# Patient Record
Sex: FEMALE | Race: WHITE | HISPANIC OR LATINO | URBAN - METROPOLITAN AREA
[De-identification: names, ages, dates, MRNs, and addresses within clinical notes are randomized per-mention and may not be internally consistent; named-entity substitution may affect disease eponyms.]

---

## 2022-07-09 ENCOUNTER — TELEPHONE ENCOUNTER (OUTPATIENT)
Dept: URBAN - METROPOLITAN AREA CLINIC 121 | Facility: CLINIC | Age: 47
End: 2022-07-09

## 2022-07-10 ENCOUNTER — TELEPHONE ENCOUNTER (OUTPATIENT)
Dept: URBAN - METROPOLITAN AREA CLINIC 121 | Facility: CLINIC | Age: 47
End: 2022-07-10

## 2022-07-30 ENCOUNTER — TELEPHONE ENCOUNTER (OUTPATIENT)
Age: 47
End: 2022-07-30

## 2022-07-31 ENCOUNTER — TELEPHONE ENCOUNTER (OUTPATIENT)
Age: 47
End: 2022-07-31

## 2022-08-01 ENCOUNTER — OFFICE VISIT (OUTPATIENT)
Dept: URBAN - METROPOLITAN AREA CLINIC 60 | Facility: CLINIC | Age: 47
End: 2022-08-01
Payer: COMMERCIAL

## 2022-08-01 ENCOUNTER — WEB ENCOUNTER (OUTPATIENT)
Dept: URBAN - METROPOLITAN AREA CLINIC 60 | Facility: CLINIC | Age: 47
End: 2022-08-01

## 2022-08-01 ENCOUNTER — LAB OUTSIDE AN ENCOUNTER (OUTPATIENT)
Dept: URBAN - METROPOLITAN AREA CLINIC 60 | Facility: CLINIC | Age: 47
End: 2022-08-01

## 2022-08-01 VITALS
SYSTOLIC BLOOD PRESSURE: 140 MMHG | WEIGHT: 225 LBS | OXYGEN SATURATION: 98 % | TEMPERATURE: 98.2 F | DIASTOLIC BLOOD PRESSURE: 80 MMHG | HEIGHT: 68 IN | HEART RATE: 88 BPM | RESPIRATION RATE: 20 BRPM | BODY MASS INDEX: 34.1 KG/M2

## 2022-08-01 DIAGNOSIS — K31.A12 INTESTINAL METAPLASIA OF BODY OF STOMACH WITHOUT DYSPLASIA: ICD-10-CM

## 2022-08-01 DIAGNOSIS — D50.8 OTHER IRON DEFICIENCY ANEMIA: ICD-10-CM

## 2022-08-01 PROCEDURE — 99203 OFFICE O/P NEW LOW 30 MIN: CPT | Performed by: INTERNAL MEDICINE

## 2022-08-01 RX ORDER — ONDANSETRON HYDROCHLORIDE 4 MG/1
1 TABLET TABLET, FILM COATED ORAL
Qty: 2 | Refills: 0 | OUTPATIENT
Start: 2022-08-01

## 2022-08-01 RX ORDER — FERROUS SULFATE 325(65) MG
1 TABLET TABLET ORAL ONCE A DAY
Status: ACTIVE | COMMUNITY

## 2022-08-01 NOTE — HPI-TODAY'S VISIT:
Martín is a pleasant 47-year-old female who presents today for evaluation of Benji, pernicious anemia and gastric intestinal metaplasia.  Seen in 2018 in consultation of sharp epigastric pain.  Status post laparoscopic sleeve gastrectomy on 7/6/2022 with Dr. Nelson.  Pathology demonstrated chronic gastritis and intestinal metaplasia.  H. pylori negative.  Upper GI series dated 7/7/2022 showed no postoperative leak or obstruction.  Contrasted sluggishly moving to the stomach from the esophagus which may be related to postoperative edema  Labs dated 7/7/2022 showed WBC 11.7, RBC 3.78, hemoglobin 8.4, hematocrit 29.1, MCV 77, platelets normal.  Sodium 134.  Normal renal function.  Previously with elevated parietal cell antibody and positive intrinsic factor antibody.  Patient currently denies any abd pain, rectal bleeding  diarrhea. States experiencing straining with BM which is chronic. Denies  rectal bleeding, family hx of CRC or polyps.  Does have menorraghea though improved over past 2 years( shorter duration).  States anemia is chronic . Denies melena. Lost 25lbs since surgery

## 2022-08-24 LAB
% SATURATION: 17
FERRITIN: 14
FOLATE (FOLIC ACID), SERUM: 13.2
INTRINSIC FACTOR BLOCKING: NEGATIVE
IRON BINDING CAPACITY: 282
IRON, TOTAL: 47
VITAMIN B12: 194

## 2022-09-06 ENCOUNTER — CLAIMS CREATED FROM THE CLAIM WINDOW (OUTPATIENT)
Dept: URBAN - METROPOLITAN AREA CLINIC 4 | Facility: CLINIC | Age: 47
End: 2022-09-06
Payer: COMMERCIAL

## 2022-09-06 ENCOUNTER — OFFICE VISIT (OUTPATIENT)
Dept: URBAN - METROPOLITAN AREA SURGERY CENTER 4 | Facility: SURGERY CENTER | Age: 47
End: 2022-09-06
Payer: COMMERCIAL

## 2022-09-06 DIAGNOSIS — K31.89 OTHER DISEASES OF STOMACH AND DUODENUM: ICD-10-CM

## 2022-09-06 DIAGNOSIS — K29.70 GASTRITIS, UNSPECIFIED, WITHOUT BLEEDING: ICD-10-CM

## 2022-09-06 DIAGNOSIS — K64.0 GRADE I HEMORRHOIDS: ICD-10-CM

## 2022-09-06 DIAGNOSIS — K31.A0 GASTRIC INTESTINAL METAPLASIA, UNSPECIFIED: ICD-10-CM

## 2022-09-06 DIAGNOSIS — K57.30 DIVERTCULOSIS OF LG INT W/O PERFORATION OR ABSCESS W/O BLEEDING: ICD-10-CM

## 2022-09-06 DIAGNOSIS — Z12.11 COLON CANCER SCREENING: ICD-10-CM

## 2022-09-06 DIAGNOSIS — K91.89 OTHER POSTPROCEDURAL COMPLICATIONS AND DISORDERS OF DIGESTIVE SYSTEM: ICD-10-CM

## 2022-09-06 DIAGNOSIS — K29.40 CHRONIC ATROPHIC GASTRITIS WITHOUT BLEEDING: ICD-10-CM

## 2022-09-06 PROCEDURE — G0121 COLON CA SCRN NOT HI RSK IND: HCPCS | Performed by: INTERNAL MEDICINE

## 2022-09-06 PROCEDURE — 88341 IMHCHEM/IMCYTCHM EA ADD ANTB: CPT | Performed by: PATHOLOGY

## 2022-09-06 PROCEDURE — 43239 EGD BIOPSY SINGLE/MULTIPLE: CPT | Performed by: INTERNAL MEDICINE

## 2022-09-06 PROCEDURE — G8918 PT W/O PREOP ORDER IV AB PRO: HCPCS | Performed by: INTERNAL MEDICINE

## 2022-09-06 PROCEDURE — 88305 TISSUE EXAM BY PATHOLOGIST: CPT | Performed by: PATHOLOGY

## 2022-09-06 PROCEDURE — G8907 PT DOC NO EVENTS ON DISCHARG: HCPCS | Performed by: INTERNAL MEDICINE

## 2022-09-06 PROCEDURE — 88342 IMHCHEM/IMCYTCHM 1ST ANTB: CPT | Performed by: PATHOLOGY

## 2022-09-06 RX ORDER — FERROUS SULFATE 325(65) MG
1 TABLET TABLET ORAL ONCE A DAY
Status: ACTIVE | COMMUNITY

## 2022-09-06 RX ORDER — ONDANSETRON HYDROCHLORIDE 4 MG/1
1 TABLET TABLET, FILM COATED ORAL
Qty: 2 | Refills: 0 | Status: ACTIVE | COMMUNITY
Start: 2022-08-01

## 2022-09-12 PROBLEM — 196731005 GASTRODUODENITIS: Status: ACTIVE | Noted: 2022-09-12

## 2022-09-12 PROBLEM — 398050005 DIVERTICULAR DISEASE OF COLON: Status: ACTIVE | Noted: 2022-09-12

## 2022-09-19 ENCOUNTER — OFFICE VISIT (OUTPATIENT)
Dept: URBAN - METROPOLITAN AREA CLINIC 60 | Facility: CLINIC | Age: 47
End: 2022-09-19

## 2022-10-26 ENCOUNTER — OFFICE VISIT (OUTPATIENT)
Dept: URBAN - METROPOLITAN AREA CLINIC 63 | Facility: CLINIC | Age: 47
End: 2022-10-26

## 2022-10-31 ENCOUNTER — OFFICE VISIT (OUTPATIENT)
Dept: URBAN - METROPOLITAN AREA CLINIC 60 | Facility: CLINIC | Age: 47
End: 2022-10-31
Payer: COMMERCIAL

## 2022-10-31 ENCOUNTER — DASHBOARD ENCOUNTERS (OUTPATIENT)
Age: 47
End: 2022-10-31

## 2022-10-31 VITALS
TEMPERATURE: 97.4 F | WEIGHT: 196 LBS | RESPIRATION RATE: 20 BRPM | HEIGHT: 68 IN | HEART RATE: 69 BPM | SYSTOLIC BLOOD PRESSURE: 128 MMHG | BODY MASS INDEX: 29.7 KG/M2 | DIASTOLIC BLOOD PRESSURE: 70 MMHG | OXYGEN SATURATION: 97 %

## 2022-10-31 DIAGNOSIS — D50.8 OTHER IRON DEFICIENCY ANEMIA: ICD-10-CM

## 2022-10-31 DIAGNOSIS — D51.0 VITAMIN B12 DEFICIENCY ANEMIA DUE TO INTRINSIC FACTOR DEFICIENCY: ICD-10-CM

## 2022-10-31 DIAGNOSIS — K31.A12 INTESTINAL METAPLASIA OF BODY OF STOMACH WITHOUT DYSPLASIA: ICD-10-CM

## 2022-10-31 PROBLEM — 87522002: Status: ACTIVE | Noted: 2022-08-01

## 2022-10-31 PROBLEM — 84027009: Status: ACTIVE | Noted: 2022-10-31

## 2022-10-31 PROCEDURE — 99213 OFFICE O/P EST LOW 20 MIN: CPT | Performed by: PHYSICIAN ASSISTANT

## 2022-10-31 RX ORDER — FERROUS SULFATE 325(65) MG
1 TABLET TABLET ORAL ONCE A DAY
Status: ACTIVE | COMMUNITY

## 2022-10-31 NOTE — HPI-TODAY'S VISIT:
Martín is a pleasant 47-year-old female who presents today for a procedure follow up. History of JANICE, pernicious anemia and gastric intestinal metaplasia.  Status post laparoscopic sleeve gastrectomy on 7/6/2022 with Dr. Nelson.  Pathology demonstrated chronic gastritis and intestinal metaplasia.  H. pylori negative.  Labs dated 8/1/2022 showed vitamin B12 194 (L).  Intrinsic factor blocking antibody negative.  Ferritin 14 but otherwise normal iron studies.  EGD dated 9/6/2022 demonstrated a regular Z-line.  Small hiatal hernia.  Gastritis.  Atrophic mucosa in the gastric antrum.  Postsurgical deformity in the entire stomach consistent with sleeve gastrectomy.  Duodenum normal. No significant duodenal abnormality.  Chemical/reactive gastropathy negative for H. pylori.  Body biopsies revealed chronic lymphoplasmacytic infiltrate, body type glandular atrophy, endocrine cell hyperplasia and focal intestinal metaplasia.  Negative for H. pylori.  Consistent with autoimmune gastritis but multifocal atrophic gastritis secondary to H. pylori infection cannot be completely ruled out.  Repeat EGD in 2 years  Colonoscopy dated 9/6/2022 showed diverticulosis in the sigmoid colon.  Nonbleeding internal hemorrhoids.  No specimens collected.  Repeat colonoscopy in 10 years per protocol.  Upper GI series dated 7/7/2022 showed no postoperative leak or obstruction.  Contrasted sluggishly moving to the stomach from the esophagus which may be related to postoperative edema  Labs dated 7/7/2022 showed WBC 11.7, RBC 3.78, hemoglobin 8.4, hematocrit 29.1, MCV 77, platelets normal.  Sodium 134.  Normal renal function.  Previously with elevated parietal cell antibody and positive intrinsic factor antibody.  No issues after procedure. History of menorrhagia. Last visit recommended Metamucil daily, prunes and a high-fiber diet.  Consideration of referral to hematology pending clinical course. Notes 1-2 daily bowel movements without constipation or diarrhea. Denies nausea, vomiting, heartburn, reflux, dysphagia, odynophagia, hematemesis, coffee ground emesis, abdominal pain, change in bowel habits, abnormal weight loss, BRBPR or melena. Denies family history of colon cancer or colon polyps. No other GI complaints at this time

## 2023-01-30 ENCOUNTER — OFFICE VISIT (OUTPATIENT)
Dept: URBAN - METROPOLITAN AREA CLINIC 60 | Facility: CLINIC | Age: 48
End: 2023-01-30

## 2024-11-18 PROBLEM — 84027009: Status: ACTIVE | Noted: 2024-11-18

## 2024-11-18 PROBLEM — 84568007: Status: ACTIVE | Noted: 2024-11-18

## 2024-11-20 ENCOUNTER — OFFICE VISIT (OUTPATIENT)
Dept: URBAN - METROPOLITAN AREA CLINIC 60 | Facility: CLINIC | Age: 49
End: 2024-11-20
Payer: COMMERCIAL

## 2024-11-20 ENCOUNTER — LAB OUTSIDE AN ENCOUNTER (OUTPATIENT)
Dept: URBAN - METROPOLITAN AREA CLINIC 60 | Facility: CLINIC | Age: 49
End: 2024-11-20

## 2024-11-20 VITALS
RESPIRATION RATE: 12 BRPM | OXYGEN SATURATION: 97 % | HEIGHT: 68 IN | SYSTOLIC BLOOD PRESSURE: 128 MMHG | WEIGHT: 187.8 LBS | TEMPERATURE: 98.7 F | DIASTOLIC BLOOD PRESSURE: 72 MMHG | HEART RATE: 76 BPM | BODY MASS INDEX: 28.46 KG/M2

## 2024-11-20 DIAGNOSIS — K31.A12 INTESTINAL METAPLASIA OF BODY OF STOMACH WITHOUT DYSPLASIA: ICD-10-CM

## 2024-11-20 DIAGNOSIS — D51.0 PERNICIOUS ANEMIA: ICD-10-CM

## 2024-11-20 DIAGNOSIS — K29.40 ATROPHIC GASTRITIS WITHOUT HEMORRHAGE: ICD-10-CM

## 2024-11-20 PROCEDURE — 99213 OFFICE O/P EST LOW 20 MIN: CPT

## 2024-11-20 RX ORDER — CYANOCOBALAMIN 1000 UG/ML
1 ML INJECTION INTRAMUSCULAR; SUBCUTANEOUS
Status: ACTIVE | COMMUNITY

## 2024-11-20 NOTE — HPI-TODAY'S VISIT:
Patient is a 49-year-old female with a past medical history of pernicious anemia and autoimmune intestinal metaplasia who presents today for EGD evaluation.  Patient is currently following with [facility] and is receiving iron infusions twice a year and vitamin B12 injections once a month.  They continue to monitor her blood work as well.  Reviewed last EGD and patient is due for repeat EGD for monitoring.  Patient denies fever, dysphagia, acid reflux, change in appetite, abdominal pain, nausea, vomiting, diarrhea, constipation, hematemesis, hematochezia, melena, change in stool frequency/caliber, unintentional weight loss/gain. She also denies issues with anesthesia, history of stroke, heart attack, pacemaker, defibrillator, stents, blood thinners, COPD, asthma, LEESA, chronic kidney disease and seizures. . Colonoscopy 9/6/2022 with Dr. Sandoval - Diverticulosis in the sigmoid colon - Nonbleeding internal hemorrhoids - No specimens collected - Repeat colonoscopy in 10 years . EGD 9/6/2022 with Dr. Sandoval - Z-line regular - Small hiatal hernia - Gastritis, biopsied - Gastric mucosal atrophy, biopsied - Full surgical deformity in the entire stomach - Normal first portion, second portion and third portion of duodenum, biopsied - Pathology: Second part duodenum biopsy-no significant abnormality; stomach antrum biopsy-chemical/reactive gastropathy, no evidence of H. pylori or intestinal metaplasia, negative for dysplasia or malignancy; stomach body biopsy-chronic lymphoplasmacytic infiltrate body type glandular atrophy, endocrine cell hyperplasia and focal intestinal metaplasia, negative for H. pylori, dysplasia or malignancy

## 2025-03-07 ENCOUNTER — OFFICE VISIT (OUTPATIENT)
Dept: URBAN - METROPOLITAN AREA SURGERY CENTER 4 | Facility: SURGERY CENTER | Age: 50
End: 2025-03-07

## 2025-03-21 ENCOUNTER — OFFICE VISIT (OUTPATIENT)
Dept: URBAN - METROPOLITAN AREA CLINIC 60 | Facility: CLINIC | Age: 50
End: 2025-03-21

## 2025-04-04 ENCOUNTER — CLAIMS CREATED FROM THE CLAIM WINDOW (OUTPATIENT)
Dept: URBAN - METROPOLITAN AREA SURGERY CENTER 4 | Facility: SURGERY CENTER | Age: 50
End: 2025-04-04
Payer: COMMERCIAL

## 2025-04-04 ENCOUNTER — CLAIMS CREATED FROM THE CLAIM WINDOW (OUTPATIENT)
Dept: URBAN - METROPOLITAN AREA CLINIC 4 | Facility: CLINIC | Age: 50
End: 2025-04-04
Payer: COMMERCIAL

## 2025-04-04 DIAGNOSIS — K29.40 CHRONIC ATROPHIC GASTRITIS WITHOUT BLEEDING: ICD-10-CM

## 2025-04-04 DIAGNOSIS — Z90.3 HISTORY OF SLEEVE GASTRECTOMY: ICD-10-CM

## 2025-04-04 DIAGNOSIS — K29.70 GASTRITIS, UNSPECIFIED, WITHOUT BLEEDING: ICD-10-CM

## 2025-04-04 DIAGNOSIS — R10.13 DYSPEPSIA: ICD-10-CM

## 2025-04-04 DIAGNOSIS — K31.A0 GASTRIC INTESTINAL METAPLASIA, UNSPECIFIED: ICD-10-CM

## 2025-04-04 PROCEDURE — 88342 IMHCHEM/IMCYTCHM 1ST ANTB: CPT | Performed by: PATHOLOGY

## 2025-04-04 PROCEDURE — 88305 TISSUE EXAM BY PATHOLOGIST: CPT | Performed by: PATHOLOGY

## 2025-04-04 PROCEDURE — 43239 EGD BIOPSY SINGLE/MULTIPLE: CPT | Performed by: INTERNAL MEDICINE

## 2025-04-04 PROCEDURE — 88312 SPECIAL STAINS GROUP 1: CPT | Performed by: PATHOLOGY

## 2025-04-04 PROCEDURE — 88341 IMHCHEM/IMCYTCHM EA ADD ANTB: CPT | Performed by: PATHOLOGY

## 2025-04-04 PROCEDURE — 00731 ANES UPR GI NDSC PX NOS: CPT | Performed by: NURSE ANESTHETIST, CERTIFIED REGISTERED

## 2025-04-04 RX ORDER — CYANOCOBALAMIN 1000 UG/ML
1 ML INJECTION INTRAMUSCULAR; SUBCUTANEOUS
Status: ACTIVE | COMMUNITY

## 2025-04-18 ENCOUNTER — OFFICE VISIT (OUTPATIENT)
Dept: URBAN - METROPOLITAN AREA CLINIC 60 | Facility: CLINIC | Age: 50
End: 2025-04-18
Payer: COMMERCIAL

## 2025-04-18 DIAGNOSIS — D51.0 PERNICIOUS ANEMIA: ICD-10-CM

## 2025-04-18 DIAGNOSIS — K59.09 CHRONIC CONSTIPATION: ICD-10-CM

## 2025-04-18 DIAGNOSIS — K31.A12 INTESTINAL METAPLASIA OF BODY OF STOMACH WITHOUT DYSPLASIA: ICD-10-CM

## 2025-04-18 DIAGNOSIS — K29.40 ATROPHIC GASTRITIS WITHOUT HEMORRHAGE: ICD-10-CM

## 2025-04-18 PROCEDURE — 99213 OFFICE O/P EST LOW 20 MIN: CPT

## 2025-04-18 RX ORDER — CYANOCOBALAMIN 1000 UG/ML
1 ML INJECTION INTRAMUSCULAR; SUBCUTANEOUS
Status: ACTIVE | COMMUNITY

## 2025-04-18 NOTE — HPI-TODAY'S VISIT:
Patient is a 49-year-old female with a past medical history of pernicious anemia and autoimmune intestinal metaplasia who presents today for review of EGD results.  Patient is currently following with [facility] and is receiving iron infusions twice a year and vitamin B-12 injections once a month.  They also continue to monitor her blood work and per patient everything has been okay.  Reviewed EGD results with patient.  At today's visit she is complaining of constipation issues that have been ongoing for most of her life.  She states that she will take Dulcolax as needed which does help her symptoms however she is not on any daily regimen. . EGD 4/4/2025 - Normal esophagus - Sleeve gastrectomy found characterized by healthy-appearing mucosa, biopsied - Normal examined duodenum - Pathology: Stomach antrum biopsy-chemical/reactive gastropathy, no evidence of H. pylori or intestinal metaplasia, negative for dysplasia or malignancy; stomach body and fundus biopsies-chronic lymphoplasmacytic infiltrate, body type glandular atrophy, endocrine cell hyperplasia and focal intestinal metaplasia, negative for H. pylori, dysplasia or malignancy - Repeat EGD in 2 years . Colonoscopy 9/6/2022 with Dr. Sandoval - Diverticulosis in the sigmoid colon - Nonbleeding internal hemorrhoids - No specimens collected - Repeat colonoscopy in 10 years . EGD 9/6/2022 with Dr. Sandoval - Z-line regular - Small hiatal hernia - Gastritis, biopsied - Gastric mucosal atrophy, biopsied - Full surgical deformity in the entire stomach - Normal first portion, second portion and third portion of duodenum, biopsied - Pathology: Second part duodenum biopsy-no significant abnormality; stomach antrum biopsy-chemical/reactive gastropathy, no evidence of H. pylori or intestinal metaplasia, negative for dysplasia or malignancy; stomach body biopsy-chronic lymphoplasmacytic infiltrate body type glandular atrophy, endocrine cell hyperplasia and focal intestinal metaplasia, negative for H. pylori, dysplasia or malignancy

## 2025-07-18 ENCOUNTER — OFFICE VISIT (OUTPATIENT)
Dept: URBAN - METROPOLITAN AREA CLINIC 60 | Facility: CLINIC | Age: 50
End: 2025-07-18

## 2025-07-18 NOTE — HPI-TODAY'S VISIT:
LOV 4/18/2025 History of atrophic gastritis, gastric intestinal metaplasia and pernicious anemia-currently following with [facility] for monitoring and treatment, repeat EGD due in 2027 Fiber supplement? MiraLAX as needed?  Magnesium citrate as needed?    LOV Patient is a 49-year-old female with a past medical history of pernicious anemia and autoimmune intestinal metaplasia who presents today for review of EGD results.  Patient is currently following with [facility] and is receiving iron infusions twice a year and vitamin B-12 injections once a month.  They also continue to monitor her blood work and per patient everything has been okay.  Reviewed EGD results with patient.  At today's visit she is complaining of constipation issues that have been ongoing for most of her life.  She states that she will take Dulcolax as needed which does help her symptoms however she is not on any daily regimen. . EGD 4/4/2025 - Normal esophagus - Sleeve gastrectomy found characterized by healthy-appearing mucosa, biopsied - Normal examined duodenum - Pathology: Stomach antrum biopsy-chemical/reactive gastropathy, no evidence of H. pylori or intestinal metaplasia, negative for dysplasia or malignancy; stomach body and fundus biopsies-chronic lymphoplasmacytic infiltrate, body type glandular atrophy, endocrine cell hyperplasia and focal intestinal metaplasia, negative for H. pylori, dysplasia or malignancy - Repeat EGD in 2 years . Colonoscopy 9/6/2022 with Dr. Sandoval - Diverticulosis in the sigmoid colon - Nonbleeding internal hemorrhoids - No specimens collected - Repeat colonoscopy in 10 years . EGD 9/6/2022 with Dr. Sandoval - Z-line regular - Small hiatal hernia - Gastritis, biopsied - Gastric mucosal atrophy, biopsied - Full surgical deformity in the entire stomach - Normal first portion, second portion and third portion of duodenum, biopsied - Pathology: Second part duodenum biopsy-no significant abnormality; stomach antrum biopsy-chemical/reactive gastropathy, no evidence of H. pylori or intestinal metaplasia, negative for dysplasia or malignancy; stomach body biopsy-chronic lymphoplasmacytic infiltrate body type glandular atrophy, endocrine cell hyperplasia and focal intestinal metaplasia, negative for H. pylori, dysplasia or malignancy